# Patient Record
Sex: FEMALE | Race: WHITE | NOT HISPANIC OR LATINO | Employment: UNEMPLOYED | URBAN - METROPOLITAN AREA
[De-identification: names, ages, dates, MRNs, and addresses within clinical notes are randomized per-mention and may not be internally consistent; named-entity substitution may affect disease eponyms.]

---

## 2023-02-24 LAB — THYROTROPIN (MIU/L) IN SER/PLAS BY DETECTION LIMIT <= 0.05 MIU/L: 0.03 MIU/L (ref 0.44–3.98)

## 2023-02-28 LAB
THYROGLOBULIN AB (IU/ML) IN SER/PLAS: <0.9 IU/ML (ref 0–4)
THYROGLOBULIN LC-MS/MS: ABNORMAL NG/ML (ref 1.3–31.8)
THYROGLOBULIN: 0.1 NG/ML (ref 1.3–31.8)

## 2023-05-01 ENCOUNTER — TELEPHONE (OUTPATIENT)
Dept: PRIMARY CARE | Facility: CLINIC | Age: 48
End: 2023-05-01

## 2023-05-01 NOTE — TELEPHONE ENCOUNTER
Pt has loss weight and is still taking BP med twice a day. Her BP is low at 104/74 ,106/72. She's asking if her meds should be adjusted?  
DISPLAY PLAN FREE TEXT
DISPLAY PLAN FREE TEXT

## 2023-06-14 PROBLEM — N91.2 AMENORRHEA: Status: ACTIVE | Noted: 2023-06-14

## 2023-06-14 PROBLEM — R73.02 IGT (IMPAIRED GLUCOSE TOLERANCE): Status: ACTIVE | Noted: 2023-06-14

## 2023-06-14 PROBLEM — I10 HTN (HYPERTENSION), BENIGN: Status: ACTIVE | Noted: 2023-06-14

## 2023-06-14 PROBLEM — R91.1 LUNG NODULE: Status: ACTIVE | Noted: 2023-06-14

## 2023-06-14 PROBLEM — E78.6 HDL DEFICIENCY: Status: ACTIVE | Noted: 2023-06-14

## 2023-06-14 PROBLEM — C73 HURTHLE CELL CARCINOMA OF THYROID (MULTI): Status: ACTIVE | Noted: 2023-06-14

## 2023-06-14 PROBLEM — R59.1 LYMPHADENOPATHY: Status: ACTIVE | Noted: 2023-06-14

## 2023-06-14 RX ORDER — LEVOTHYROXINE SODIUM 112 UG/1
112 TABLET ORAL DAILY
COMMUNITY
Start: 2023-04-24 | End: 2024-01-18 | Stop reason: SDUPTHER

## 2023-06-14 RX ORDER — LEVOTHYROXINE SODIUM 125 UG/1
125 TABLET ORAL DAILY
COMMUNITY
End: 2023-06-15 | Stop reason: ALTCHOICE

## 2023-06-14 RX ORDER — AMLODIPINE BESYLATE 5 MG/1
5 TABLET ORAL DAILY
COMMUNITY
Start: 2018-01-15 | End: 2023-09-01

## 2023-06-15 ENCOUNTER — OFFICE VISIT (OUTPATIENT)
Dept: PRIMARY CARE | Facility: CLINIC | Age: 48
End: 2023-06-15
Payer: COMMERCIAL

## 2023-06-15 VITALS
OXYGEN SATURATION: 100 % | HEART RATE: 70 BPM | HEIGHT: 62 IN | WEIGHT: 144 LBS | TEMPERATURE: 97.1 F | SYSTOLIC BLOOD PRESSURE: 144 MMHG | DIASTOLIC BLOOD PRESSURE: 90 MMHG | BODY MASS INDEX: 26.5 KG/M2

## 2023-06-15 DIAGNOSIS — R73.02 IGT (IMPAIRED GLUCOSE TOLERANCE): Primary | ICD-10-CM

## 2023-06-15 DIAGNOSIS — E78.6 HDL DEFICIENCY: ICD-10-CM

## 2023-06-15 DIAGNOSIS — C73 HURTHLE CELL CARCINOMA OF THYROID (MULTI): ICD-10-CM

## 2023-06-15 DIAGNOSIS — R91.1 LUNG NODULE: ICD-10-CM

## 2023-06-15 DIAGNOSIS — I10 HTN (HYPERTENSION), BENIGN: ICD-10-CM

## 2023-06-15 PROCEDURE — 99213 OFFICE O/P EST LOW 20 MIN: CPT | Performed by: INTERNAL MEDICINE

## 2023-06-15 PROCEDURE — 3077F SYST BP >= 140 MM HG: CPT | Performed by: INTERNAL MEDICINE

## 2023-06-15 PROCEDURE — 3080F DIAST BP >= 90 MM HG: CPT | Performed by: INTERNAL MEDICINE

## 2023-06-15 NOTE — PROGRESS NOTES
"Subjective   Patient ID: Rula Leyva is a 47 y.o. female who presents for Follow-up (With labs).    HPI follow up thyroid ca, htn, hypothyroidism  Feels great. Losing weight w/   No problems    Review of Systems  Bps 110/70 at home. Many reviewed    Objective   /90   Pulse 70   Temp 36.2 °C (97.1 °F)   Ht 1.575 m (5' 2\")   Wt 65.3 kg (144 lb)   SpO2 100%   BMI 26.34 kg/m²     Physical Exam  Gen nad, affect wnl  Heentt eomfg, face symmetric, ncat  Neck w/o la, tm, bruit  Lungs clear   Cv rrr nl s1, s2  Ext w/o edema  Neuro grossly nonfocal  Skin good color    Assessment/Plan   Diagnoses and all orders for this visit:  IGT (impaired glucose tolerance)  -     Comprehensive metabolic panel; Future  -     Lipid Panel; Future  HTN (hypertension), benign  -     Comprehensive metabolic panel; Future  -     Lipid Panel; Future  Hurthle cell carcinoma of thyroid (CMS/HCC)  Lung nodule  HDL deficiency     Follow up endo, gi, gy  See me in 6 mos  "

## 2023-06-28 LAB — THYROTROPIN (MIU/L) IN SER/PLAS BY DETECTION LIMIT <= 0.05 MIU/L: 0.1 MIU/L (ref 0.44–3.98)

## 2023-09-01 DIAGNOSIS — I10 HTN (HYPERTENSION), BENIGN: Primary | ICD-10-CM

## 2023-09-01 RX ORDER — AMLODIPINE BESYLATE 5 MG/1
5 TABLET ORAL 2 TIMES DAILY
Qty: 180 TABLET | Refills: 0 | Status: SHIPPED | OUTPATIENT
Start: 2023-09-01 | End: 2023-12-06

## 2023-11-17 ENCOUNTER — OFFICE VISIT (OUTPATIENT)
Dept: OTOLARYNGOLOGY | Facility: CLINIC | Age: 48
End: 2023-11-17
Payer: COMMERCIAL

## 2023-11-17 VITALS — TEMPERATURE: 97 F | BODY MASS INDEX: 26.39 KG/M2 | HEIGHT: 62 IN | WEIGHT: 143.4 LBS

## 2023-11-17 DIAGNOSIS — C73 HURTHLE CELL CARCINOMA OF THYROID (MULTI): Primary | ICD-10-CM

## 2023-11-17 DIAGNOSIS — H93.13 TINNITUS OF BOTH EARS: ICD-10-CM

## 2023-11-17 PROCEDURE — 1036F TOBACCO NON-USER: CPT | Performed by: OTOLARYNGOLOGY

## 2023-11-17 PROCEDURE — 99214 OFFICE O/P EST MOD 30 MIN: CPT | Performed by: OTOLARYNGOLOGY

## 2023-11-17 ASSESSMENT — PATIENT HEALTH QUESTIONNAIRE - PHQ9
2. FEELING DOWN, DEPRESSED OR HOPELESS: NOT AT ALL
SUM OF ALL RESPONSES TO PHQ9 QUESTIONS 1 AND 2: 0
1. LITTLE INTEREST OR PLEASURE IN DOING THINGS: NOT AT ALL

## 2023-11-17 NOTE — PROGRESS NOTES
s/p total thyroidectomy 2015 with post op jones    Followed by dr pavon     Slowly lowering levothyroxine    Good energy  Lost weigth intentionally      She is having some tinnitus right-sided high-pitched left-sided intermittently pulsatile    She has changed her blood pressure medicine to half a pill since losing weight      Physical Exam:  CONSTITUTIONAL:  No acute distress  VOICE:  No hoarseness or other abnormality  RESPIRATION:  Breathing comfortably, no stridor  CV:  No clubbing/cyanosis/edema in hands  EYES:  EOM intact, sclera normal  NEURO:  Alert and oriented times 3, Cranial nerves II-XII grossly intact and symmetric bilaterally  HEAD AND FACE:  Symmetric facial features, no masses or lesions, sinuses non-tender to palpation  SALIVARY GLANDS:  Parotid and submandibular glands normal bilaterally  EARS:  Normal external ears, external auditory canals, and TMs to otoscopy, normal hearing to whispered voice.  NOSE:  External nose midline, anterior rhinoscopy is normal with limited visualization to the anterior aspect of the interior turbinates, no bleeding or drainage, no lesions  ORAL CAVITY/OROPHARYNX/LIPS:  Normal mucous membranes, normal floor of mouth/tongue/OP, no masses or lesions  PHARYNGEAL WALLS:  No masses or lesions  NECK/LYMPH:  No LAD, no thyroid masses, trachea midline  SKIN:  helaed incision  PSYCH:  Alert and oriented with appropriate mood and affect             S/p     Stable post op    We will follow with Dr. Miller and will see me in a year we will obtain an audiogram for her tinnitus      We did discuss a differential diagnosis

## 2023-12-06 DIAGNOSIS — I10 HTN (HYPERTENSION), BENIGN: ICD-10-CM

## 2023-12-06 RX ORDER — AMLODIPINE BESYLATE 5 MG/1
5 TABLET ORAL 2 TIMES DAILY
Qty: 180 TABLET | Refills: 0 | Status: SHIPPED | OUTPATIENT
Start: 2023-12-06 | End: 2024-03-11

## 2023-12-12 ENCOUNTER — LAB (OUTPATIENT)
Dept: LAB | Facility: LAB | Age: 48
End: 2023-12-12
Payer: COMMERCIAL

## 2023-12-12 DIAGNOSIS — R73.02 IGT (IMPAIRED GLUCOSE TOLERANCE): ICD-10-CM

## 2023-12-12 DIAGNOSIS — I10 HTN (HYPERTENSION), BENIGN: ICD-10-CM

## 2023-12-12 LAB
ALBUMIN SERPL BCP-MCNC: 4.4 G/DL (ref 3.4–5)
ALP SERPL-CCNC: 56 U/L (ref 33–110)
ALT SERPL W P-5'-P-CCNC: 15 U/L (ref 7–45)
ANION GAP SERPL CALC-SCNC: 11 MMOL/L (ref 10–20)
AST SERPL W P-5'-P-CCNC: 17 U/L (ref 9–39)
BILIRUB SERPL-MCNC: 0.7 MG/DL (ref 0–1.2)
BUN SERPL-MCNC: 18 MG/DL (ref 6–23)
CALCIUM SERPL-MCNC: 9 MG/DL (ref 8.6–10.3)
CHLORIDE SERPL-SCNC: 103 MMOL/L (ref 98–107)
CHOLEST SERPL-MCNC: 226 MG/DL (ref 0–199)
CHOLESTEROL/HDL RATIO: 2.5
CO2 SERPL-SCNC: 28 MMOL/L (ref 21–32)
CREAT SERPL-MCNC: 0.91 MG/DL (ref 0.5–1.05)
GFR SERPL CREATININE-BSD FRML MDRD: 77 ML/MIN/1.73M*2
GLUCOSE SERPL-MCNC: 78 MG/DL (ref 74–99)
HDLC SERPL-MCNC: 90 MG/DL
LDLC SERPL CALC-MCNC: 129 MG/DL
NON HDL CHOLESTEROL: 136 MG/DL (ref 0–149)
POTASSIUM SERPL-SCNC: 4.4 MMOL/L (ref 3.5–5.3)
PROT SERPL-MCNC: 7.3 G/DL (ref 6.4–8.2)
SODIUM SERPL-SCNC: 138 MMOL/L (ref 136–145)
TRIGL SERPL-MCNC: 34 MG/DL (ref 0–149)
VLDL: 7 MG/DL (ref 0–40)

## 2023-12-12 PROCEDURE — 80053 COMPREHEN METABOLIC PANEL: CPT

## 2023-12-12 PROCEDURE — 36415 COLL VENOUS BLD VENIPUNCTURE: CPT

## 2023-12-12 PROCEDURE — 80061 LIPID PANEL: CPT

## 2023-12-18 ENCOUNTER — APPOINTMENT (OUTPATIENT)
Dept: PRIMARY CARE | Facility: CLINIC | Age: 48
End: 2023-12-18
Payer: COMMERCIAL

## 2024-01-15 ENCOUNTER — LAB (OUTPATIENT)
Dept: LAB | Facility: LAB | Age: 49
End: 2024-01-15
Payer: COMMERCIAL

## 2024-01-15 DIAGNOSIS — C73 MALIGNANT NEOPLASM OF THYROID GLAND (MULTI): Primary | ICD-10-CM

## 2024-01-15 LAB — TSH SERPL-ACNC: 5.12 MIU/L (ref 0.44–3.98)

## 2024-01-15 PROCEDURE — 84432 ASSAY OF THYROGLOBULIN: CPT

## 2024-01-15 PROCEDURE — 36415 COLL VENOUS BLD VENIPUNCTURE: CPT

## 2024-01-15 PROCEDURE — 84443 ASSAY THYROID STIM HORMONE: CPT

## 2024-01-15 PROCEDURE — 86800 THYROGLOBULIN ANTIBODY: CPT

## 2024-01-17 LAB
BILL ONLY-THYROGLOBULIN: NORMAL
THYROGLOB AB SERPL-ACNC: <0.9 IU/ML (ref 0–4)
THYROGLOB SERPL-MCNC: 0.1 NG/ML (ref 1.3–31.8)
THYROGLOB SERPL-MCNC: ABNORMAL NG/ML (ref 1.3–31.8)

## 2024-01-18 ENCOUNTER — OFFICE VISIT (OUTPATIENT)
Dept: ENDOCRINOLOGY | Facility: CLINIC | Age: 49
End: 2024-01-18
Payer: COMMERCIAL

## 2024-01-18 VITALS
DIASTOLIC BLOOD PRESSURE: 97 MMHG | HEIGHT: 62 IN | HEART RATE: 77 BPM | WEIGHT: 140.21 LBS | RESPIRATION RATE: 20 BRPM | BODY MASS INDEX: 25.8 KG/M2 | SYSTOLIC BLOOD PRESSURE: 167 MMHG

## 2024-01-18 DIAGNOSIS — C73 HURTHLE CELL CARCINOMA OF THYROID (MULTI): Primary | ICD-10-CM

## 2024-01-18 PROCEDURE — 1036F TOBACCO NON-USER: CPT | Performed by: INTERNAL MEDICINE

## 2024-01-18 PROCEDURE — 3080F DIAST BP >= 90 MM HG: CPT | Performed by: INTERNAL MEDICINE

## 2024-01-18 PROCEDURE — 3077F SYST BP >= 140 MM HG: CPT | Performed by: INTERNAL MEDICINE

## 2024-01-18 PROCEDURE — 99213 OFFICE O/P EST LOW 20 MIN: CPT | Performed by: INTERNAL MEDICINE

## 2024-01-18 RX ORDER — LEVOTHYROXINE SODIUM 112 UG/1
112 TABLET ORAL EVERY OTHER DAY
Qty: 45 TABLET | Refills: 3 | Status: SHIPPED | OUTPATIENT
Start: 2024-01-18 | End: 2025-01-17

## 2024-01-18 RX ORDER — LEVOTHYROXINE SODIUM 100 UG/1
100 TABLET ORAL EVERY OTHER DAY
Qty: 45 TABLET | Refills: 3 | Status: SHIPPED | OUTPATIENT
Start: 2024-01-18 | End: 2025-01-17

## 2024-01-18 ASSESSMENT — PATIENT HEALTH QUESTIONNAIRE - PHQ9
SUM OF ALL RESPONSES TO PHQ9 QUESTIONS 1 AND 2: 0
1. LITTLE INTEREST OR PLEASURE IN DOING THINGS: NOT AT ALL
2. FEELING DOWN, DEPRESSED OR HOPELESS: NOT AT ALL

## 2024-01-18 ASSESSMENT — COLUMBIA-SUICIDE SEVERITY RATING SCALE - C-SSRS
2. HAVE YOU ACTUALLY HAD ANY THOUGHTS OF KILLING YOURSELF?: NO
6. HAVE YOU EVER DONE ANYTHING, STARTED TO DO ANYTHING, OR PREPARED TO DO ANYTHING TO END YOUR LIFE?: NO
1. IN THE PAST MONTH, HAVE YOU WISHED YOU WERE DEAD OR WISHED YOU COULD GO TO SLEEP AND NOT WAKE UP?: NO

## 2024-01-18 ASSESSMENT — ENCOUNTER SYMPTOMS: UNEXPECTED WEIGHT CHANGE: 0

## 2024-01-18 ASSESSMENT — PAIN SCALES - GENERAL: PAINLEVEL: 0-NO PAIN

## 2024-01-18 NOTE — PROGRESS NOTES
History Of Present Illness  Rula Leyva is a 48 y.o. female with a history of thyroid cancer.     Levothyroxine decreased from 125 to 100 mcg/day  Patient is taking levothyroxine on an empty stomach with water alone.    Thyroid cancer diagnosis date: 10/8/15  Type: Hurthle cell  Size of primary tumor: 1.7 cm, capsular and vascular invasion, no ETE  Lymph Nodes: 0/1  Distant Metastases: None known  Pathologic Staging: pT1 N0 Mx. Clinical Stage: I     Surgery: Right lobectomy (10/8/15), Completion thyroidectomy (10/22/15)      Radioiodine Therapy (11/6/15): 156 mCi, hypothyroid  Post-Therapy 131-I WBS: Focal uptake in lower neck.        Past Medical History  She has a past medical history of Acute sinusitis, unspecified (2018), Anxiety disorder, unspecified, Essential (primary) hypertension (2015), Pain in throat (2018), Personal history of malignant neoplasm, unspecified, Personal history of other diseases of the female genital tract (2017), Personal history of other endocrine, nutritional and metabolic disease (09/15/2015), Personal history of other endocrine, nutritional and metabolic disease (2015), Personal history of other specified conditions (2015), Personal history of other specified conditions (2018), Sialoadenitis, unspecified, Unspecified abnormal findings in urine (2015), and Unspecified voice and resonance disorder (10/20/2015).    Surgical History  She has a past surgical history that includes  section, classic (2014); Ankle surgery (2014); Dilation and curettage of uterus (2014); Tubal ligation (2014); Total thyroidectomy (2016); US guided thyroid biopsy (2015); and CT guided percutaneous biopsy lung (2015).     Social History  She reports that she has never smoked. She has never used smokeless tobacco. She reports current alcohol use. She reports that she does not use drugs.    Family History  Family  "History   Problem Relation Name Age of Onset    Breast cancer Other          Unknown Family Member    Pancreatic cancer Other          Unknown Family Member       Medications  Current Outpatient Medications   Medication Instructions    amLODIPine (NORVASC) 5 mg, oral, 2 times daily    levothyroxine (SYNTHROID, LEVOXYL) 112 mcg, oral, Daily       Allergies  Amoxicillin-pot clavulanate and Azithromycin    Review of Systems   Constitutional:  Negative for unexpected weight change.         Last Recorded Vitals  Blood pressure (!) 167/97, pulse 77, resp. rate 20, height 1.575 m (5' 2\"), weight 63.6 kg (140 lb 3.4 oz).    Physical Exam  Constitutional:       General: She is not in acute distress.  Neurological:      Mental Status: She is alert.          Relevant Results  Lab Results   Component Value Date    TSH 5.12 (H) 01/15/2024    THYROGLOBU 0.1 (L) 01/15/2024    THYROGLCMSMS Not Applicable 01/15/2024    THYROGLOBULI <0.9 01/15/2024           IMPRESSION  HURTHLE CELL CARCINOMA OF THYROID  Single lesion right lobe with capsular and vascular invasion, no extrathyroidal extension, no known lymph node involvement or metastasis.  No Thyroglobulin evidence of persistent disease at 6 year.   TSH elevated, previously suppressed on 112 or 125 mcg/day      RECOMMENDATIONS  Levothyroxine alternate 100 vs 112 mcg/day  Take levothyroxine on an empty stomach with water alone, 1 hour before eating or taking other medications, 4 hours before any calcium or iron supplement.    Follow up 4-6 months  Repeat TSH before next appointment      "

## 2024-01-18 NOTE — PATIENT INSTRUCTIONS
RECOMMENDATIONS  Levothyroxine alternate 100 vs 112 mcg/day  Take levothyroxine on an empty stomach with water alone, 1 hour before eating or taking other medications, 4 hours before any calcium or iron supplement.    Follow up 4-6 months  Repeat TSH before next appointment

## 2024-01-18 NOTE — LETTER
January 18, 2024     Arnel Rodriguez MD  [address]  [facility], [address]  [place] [address]    Patient: Rula Leyva   YOB: 1975   Date of Visit: 1/18/2024       Dear Dr. Arnel Rodriguez MD:    Thank you for referring Rula Leyva to me for evaluation. Below are my notes for this consultation.  If you have questions, please do not hesitate to call me. I look forward to following your patient along with you.       Sincerely,     Chauncey Lawson MD      CC: Devyn Romano MD  ______________________________________________________________________________________    History Of Present Illness  Rula Leyva is a 48 y.o. female with a history of thyroid cancer.     Levothyroxine decreased from 125 to 100 mcg/day  Patient is taking levothyroxine on an empty stomach with water alone.    Thyroid cancer diagnosis date: 10/8/15  Type: Hurthle cell  Size of primary tumor: 1.7 cm, capsular and vascular invasion, no ETE  Lymph Nodes: 0/1  Distant Metastases: None known  Pathologic Staging: pT1 N0 Mx. Clinical Stage: I     Surgery: Right lobectomy (10/8/15), Completion thyroidectomy (10/22/15)      Radioiodine Therapy (11/6/15): 156 mCi, hypothyroid  Post-Therapy 131-I WBS: Focal uptake in lower neck.        Past Medical History  She has a past medical history of Acute sinusitis, unspecified (11/13/2018), Anxiety disorder, unspecified, Essential (primary) hypertension (09/02/2015), Pain in throat (02/09/2018), Personal history of malignant neoplasm, unspecified, Personal history of other diseases of the female genital tract (02/27/2017), Personal history of other endocrine, nutritional and metabolic disease (09/15/2015), Personal history of other endocrine, nutritional and metabolic disease (08/18/2015), Personal history of other specified conditions (07/29/2015), Personal history of other specified conditions (12/17/2018), Sialoadenitis, unspecified, Unspecified abnormal findings in  "urine (2015), and Unspecified voice and resonance disorder (10/20/2015).    Surgical History  She has a past surgical history that includes  section, classic (2014); Ankle surgery (2014); Dilation and curettage of uterus (2014); Tubal ligation (2014); Total thyroidectomy (2016); US guided thyroid biopsy (2015); and CT guided percutaneous biopsy lung (2015).     Social History  She reports that she has never smoked. She has never used smokeless tobacco. She reports current alcohol use. She reports that she does not use drugs.    Family History  Family History   Problem Relation Name Age of Onset   • Breast cancer Other          Unknown Family Member   • Pancreatic cancer Other          Unknown Family Member       Medications  Current Outpatient Medications   Medication Instructions   • amLODIPine (NORVASC) 5 mg, oral, 2 times daily   • levothyroxine (SYNTHROID, LEVOXYL) 112 mcg, oral, Daily       Allergies  Amoxicillin-pot clavulanate and Azithromycin    Review of Systems   Constitutional:  Negative for unexpected weight change.         Last Recorded Vitals  Blood pressure (!) 167/97, pulse 77, resp. rate 20, height 1.575 m (5' 2\"), weight 63.6 kg (140 lb 3.4 oz).    Physical Exam  Constitutional:       General: She is not in acute distress.  Neurological:      Mental Status: She is alert.          Relevant Results  Lab Results   Component Value Date    TSH 5.12 (H) 01/15/2024    THYROGLOBU 0.1 (L) 01/15/2024    THYROGLCMSMS Not Applicable 01/15/2024    THYROGLOBULI <0.9 01/15/2024           IMPRESSION  HURTHLE CELL CARCINOMA OF THYROID  Single lesion right lobe with capsular and vascular invasion, no extrathyroidal extension, no known lymph node involvement or metastasis.  No Thyroglobulin evidence of persistent disease at 6 year.   TSH elevated, previously suppressed on 112 or 125 mcg/day      RECOMMENDATIONS  Levothyroxine alternate 100 vs 112 mcg/day  Take " levothyroxine on an empty stomach with water alone, 1 hour before eating or taking other medications, 4 hours before any calcium or iron supplement.    Follow up 4-6 months  Repeat TSH before next appointment

## 2024-02-08 ENCOUNTER — APPOINTMENT (OUTPATIENT)
Dept: PRIMARY CARE | Facility: CLINIC | Age: 49
End: 2024-02-08
Payer: COMMERCIAL

## 2024-03-11 DIAGNOSIS — I10 HTN (HYPERTENSION), BENIGN: ICD-10-CM

## 2024-03-11 RX ORDER — AMLODIPINE BESYLATE 5 MG/1
5 TABLET ORAL 2 TIMES DAILY
Qty: 180 TABLET | Refills: 0 | Status: SHIPPED | OUTPATIENT
Start: 2024-03-11

## 2024-03-19 ENCOUNTER — APPOINTMENT (OUTPATIENT)
Dept: PRIMARY CARE | Facility: CLINIC | Age: 49
End: 2024-03-19
Payer: COMMERCIAL

## 2024-04-24 ENCOUNTER — OFFICE VISIT (OUTPATIENT)
Dept: PRIMARY CARE | Facility: CLINIC | Age: 49
End: 2024-04-24
Payer: COMMERCIAL

## 2024-04-24 VITALS
WEIGHT: 139 LBS | BODY MASS INDEX: 25.58 KG/M2 | TEMPERATURE: 97.3 F | HEIGHT: 62 IN | OXYGEN SATURATION: 99 % | HEART RATE: 68 BPM | SYSTOLIC BLOOD PRESSURE: 140 MMHG | DIASTOLIC BLOOD PRESSURE: 90 MMHG

## 2024-04-24 DIAGNOSIS — C73 HURTHLE CELL CARCINOMA OF THYROID (MULTI): ICD-10-CM

## 2024-04-24 DIAGNOSIS — I10 HTN (HYPERTENSION), BENIGN: Primary | ICD-10-CM

## 2024-04-24 DIAGNOSIS — R73.02 IGT (IMPAIRED GLUCOSE TOLERANCE): ICD-10-CM

## 2024-04-24 PROCEDURE — 99213 OFFICE O/P EST LOW 20 MIN: CPT | Performed by: INTERNAL MEDICINE

## 2024-04-24 PROCEDURE — 3077F SYST BP >= 140 MM HG: CPT | Performed by: INTERNAL MEDICINE

## 2024-04-24 PROCEDURE — 3080F DIAST BP >= 90 MM HG: CPT | Performed by: INTERNAL MEDICINE

## 2024-04-24 NOTE — PROGRESS NOTES
"Subjective   Patient ID: Rula Leyva is a 48 y.o. female who presents for Follow-up.    HPI  feels well  Fu thyroid ca, htn, igt, low hdl    Review of Systems  Feels good  Bps 110/70s at home    Objective   /90   Pulse 68   Temp 36.3 °C (97.3 °F)   Ht 1.575 m (5' 2\")   Wt 63 kg (139 lb)   SpO2 99%   BMI 25.42 kg/m²     Physical Exam  Gen nad, affect wnl  Heentt eomfg, face symmetric, ncat  Neck w/o la, tm, bruit  Lungs clear   Cv rrr nl s1, s2  Ext w/o edema  Neuro grossly nonfocal  Skin good color    Assessment/Plan   Diagnoses and all orders for this visit:  HTN (hypertension), benign  IGT (impaired glucose tolerance)  Hurthle cell carcinoma of thyroid (Multi)    Fu gyn, gi, endo  Fu 6 mos     "

## 2024-06-03 ENCOUNTER — LAB (OUTPATIENT)
Dept: LAB | Facility: LAB | Age: 49
End: 2024-06-03
Payer: COMMERCIAL

## 2024-06-03 DIAGNOSIS — C73 HURTHLE CELL CARCINOMA OF THYROID (MULTI): ICD-10-CM

## 2024-06-03 LAB — TSH SERPL-ACNC: 1.17 MIU/L (ref 0.44–3.98)

## 2024-06-03 PROCEDURE — 36415 COLL VENOUS BLD VENIPUNCTURE: CPT

## 2024-06-03 PROCEDURE — 84443 ASSAY THYROID STIM HORMONE: CPT

## 2024-06-20 ENCOUNTER — APPOINTMENT (OUTPATIENT)
Dept: ENDOCRINOLOGY | Facility: CLINIC | Age: 49
End: 2024-06-20
Payer: COMMERCIAL

## 2024-06-20 VITALS
WEIGHT: 138.89 LBS | SYSTOLIC BLOOD PRESSURE: 136 MMHG | BODY MASS INDEX: 25.4 KG/M2 | DIASTOLIC BLOOD PRESSURE: 89 MMHG | HEART RATE: 66 BPM

## 2024-06-20 DIAGNOSIS — C73 HURTHLE CELL CARCINOMA OF THYROID (MULTI): ICD-10-CM

## 2024-06-20 PROCEDURE — 3079F DIAST BP 80-89 MM HG: CPT | Performed by: INTERNAL MEDICINE

## 2024-06-20 PROCEDURE — 99213 OFFICE O/P EST LOW 20 MIN: CPT | Performed by: INTERNAL MEDICINE

## 2024-06-20 PROCEDURE — 3075F SYST BP GE 130 - 139MM HG: CPT | Performed by: INTERNAL MEDICINE

## 2024-06-20 RX ORDER — LEVOTHYROXINE SODIUM 112 UG/1
112 TABLET ORAL EVERY OTHER DAY
Qty: 45 TABLET | Refills: 3 | Status: SHIPPED | OUTPATIENT
Start: 2024-06-20 | End: 2025-06-20

## 2024-06-20 RX ORDER — LEVOTHYROXINE SODIUM 100 UG/1
100 TABLET ORAL EVERY OTHER DAY
Qty: 45 TABLET | Refills: 3 | Status: SHIPPED | OUTPATIENT
Start: 2024-06-20 | End: 2025-06-20

## 2024-06-20 NOTE — PROGRESS NOTES
History Of Present Illness  Rula Leyva is a 48 y.o. female with a history of thyroid cancer.     Levothyroxine 100 vs 112 mcg/day  Patient is taking levothyroxine on an empty stomach with water alone.    Thyroid cancer diagnosis date: 10/8/15  Type: Hurthle cell  Size of primary tumor: 1.7 cm, capsular and vascular invasion, no ETE  Lymph Nodes: 0/1  Distant Metastases: None known  Pathologic Staging: pT1 N0 Mx. Clinical Stage: I     Surgery: Right lobectomy (10/8/15), Completion thyroidectomy (10/22/15)      Radioiodine Therapy (11/6/15): 156 mCi, hypothyroid  Post-Therapy 131-I WBS: Focal uptake in lower neck.     Past Medical History  She has a past medical history of Acute sinusitis, unspecified (2018), Anxiety disorder, unspecified, Essential (primary) hypertension (2015), Pain in throat (2018), Personal history of malignant neoplasm, unspecified, Personal history of other diseases of the female genital tract (2017), Personal history of other endocrine, nutritional and metabolic disease (09/15/2015), Personal history of other endocrine, nutritional and metabolic disease (2015), Personal history of other specified conditions (2015), Personal history of other specified conditions (2018), Sialoadenitis, unspecified, Unspecified abnormal findings in urine (2015), and Unspecified voice and resonance disorder (10/20/2015).    Surgical History  She has a past surgical history that includes  section, classic (2014); Ankle surgery (2014); Dilation and curettage of uterus (2014); Tubal ligation (2014); Total thyroidectomy (2016); US guided thyroid biopsy (2015); and CT guided percutaneous biopsy lung (2015).     Social History  She reports that she has never smoked. She has never used smokeless tobacco. She reports current alcohol use. She reports that she does not use drugs.    Family History  Family History   Problem  Relation Name Age of Onset    Breast cancer Other          Unknown Family Member    Pancreatic cancer Other          Unknown Family Member       Medications  Current Outpatient Medications   Medication Instructions    amLODIPine (NORVASC) 5 mg, oral, 2 times daily    levothyroxine (SYNTHROID, LEVOXYL) 112 mcg, oral, Every other day    levothyroxine (SYNTHROID, LEVOXYL) 100 mcg, oral, Every other day       Allergies  Amoxicillin-pot clavulanate and Azithromycin      Last Recorded Vitals  Blood pressure 136/89, pulse 66, weight 63 kg (138 lb 14.2 oz).    Physical Exam  Constitutional:       General: She is not in acute distress.  HENT:      Head: Normocephalic.   Neurological:      Mental Status: She is alert.   Psychiatric:         Mood and Affect: Affect normal.          Relevant Results  Lab Results   Component Value Date    TSH 1.17 06/03/2024    THYROGLOBU 0.1 (L) 01/15/2024    THYROGLCMSMS Not Applicable 01/15/2024    THYROGLOBULI <0.9 01/15/2024           IMPRESSION  HURTHLE CELL CARCINOMA OF THYROID  Single lesion right lobe with capsular and vascular invasion, no extrathyroidal extension, no known lymph node involvement or metastasis.  No Thyroglobulin evidence of persistent disease at 6 year.   TSH optimal, previously suppressed on 112 or 125 mcg/day      RECOMMENDATIONS  Continue levothyroxine 100 alternating with 112 mcg every other day.   Take levothyroxine on an empty stomach with water alone, 1 hour before eating or taking other medications, 4 hours before any calcium or iron supplement.    Follow up January  Repeat TSH, thyroglobulin 1 week before next appointment

## 2024-06-20 NOTE — PATIENT INSTRUCTIONS
RECOMMENDATIONS  Continue levothyroxine 100 alternating with 112 mcg every other day.   Take levothyroxine on an empty stomach with water alone, 1 hour before eating or taking other medications, 4 hours before any calcium or iron supplement.    Follow up January  Repeat TSH, thyroglobulin 1 week before next appointment

## 2024-06-20 NOTE — LETTER
June 21, 2024     Arnel Rodriguez MD  [address]  [facility], [address]  [place] [address]    Patient: Rula Leyva   YOB: 1975   Date of Visit: 6/20/2024       Dear Dr. Arnel Rodriguez MD:    Thank you for referring Rula Leyva to me for evaluation. Below are my notes for this consultation.  If you have questions, please do not hesitate to call me. I look forward to following your patient along with you.       Sincerely,     Chauncey Lawson MD      CC: No Recipients  ______________________________________________________________________________________    History Of Present Illness  Rula Leyva is a 48 y.o. female with a history of thyroid cancer.     Levothyroxine 100 vs 112 mcg/day  Patient is taking levothyroxine on an empty stomach with water alone.    Thyroid cancer diagnosis date: 10/8/15  Type: Hurthle cell  Size of primary tumor: 1.7 cm, capsular and vascular invasion, no ETE  Lymph Nodes: 0/1  Distant Metastases: None known  Pathologic Staging: pT1 N0 Mx. Clinical Stage: I     Surgery: Right lobectomy (10/8/15), Completion thyroidectomy (10/22/15)      Radioiodine Therapy (11/6/15): 156 mCi, hypothyroid  Post-Therapy 131-I WBS: Focal uptake in lower neck.     Past Medical History  She has a past medical history of Acute sinusitis, unspecified (11/13/2018), Anxiety disorder, unspecified, Essential (primary) hypertension (09/02/2015), Pain in throat (02/09/2018), Personal history of malignant neoplasm, unspecified, Personal history of other diseases of the female genital tract (02/27/2017), Personal history of other endocrine, nutritional and metabolic disease (09/15/2015), Personal history of other endocrine, nutritional and metabolic disease (08/18/2015), Personal history of other specified conditions (07/29/2015), Personal history of other specified conditions (12/17/2018), Sialoadenitis, unspecified, Unspecified abnormal findings in urine (06/16/2015), and  Unspecified voice and resonance disorder (10/20/2015).    Surgical History  She has a past surgical history that includes  section, classic (2014); Ankle surgery (2014); Dilation and curettage of uterus (2014); Tubal ligation (2014); Total thyroidectomy (2016); US guided thyroid biopsy (2015); and CT guided percutaneous biopsy lung (2015).     Social History  She reports that she has never smoked. She has never used smokeless tobacco. She reports current alcohol use. She reports that she does not use drugs.    Family History  Family History   Problem Relation Name Age of Onset   • Breast cancer Other          Unknown Family Member   • Pancreatic cancer Other          Unknown Family Member       Medications  Current Outpatient Medications   Medication Instructions   • amLODIPine (NORVASC) 5 mg, oral, 2 times daily   • levothyroxine (SYNTHROID, LEVOXYL) 112 mcg, oral, Every other day   • levothyroxine (SYNTHROID, LEVOXYL) 100 mcg, oral, Every other day       Allergies  Amoxicillin-pot clavulanate and Azithromycin      Last Recorded Vitals  Blood pressure 136/89, pulse 66, weight 63 kg (138 lb 14.2 oz).    Physical Exam  Constitutional:       General: She is not in acute distress.  HENT:      Head: Normocephalic.   Neurological:      Mental Status: She is alert.   Psychiatric:         Mood and Affect: Affect normal.          Relevant Results  Lab Results   Component Value Date    TSH 1.17 2024    THYROGLOBU 0.1 (L) 01/15/2024    THYROGLCMSMS Not Applicable 01/15/2024    THYROGLOBULI <0.9 01/15/2024           IMPRESSION  HURTHLE CELL CARCINOMA OF THYROID  Single lesion right lobe with capsular and vascular invasion, no extrathyroidal extension, no known lymph node involvement or metastasis.  No Thyroglobulin evidence of persistent disease at 6 year.   TSH optimal, previously suppressed on 112 or 125 mcg/day      RECOMMENDATIONS  Continue levothyroxine 100 alternating  with 112 mcg every other day.   Take levothyroxine on an empty stomach with water alone, 1 hour before eating or taking other medications, 4 hours before any calcium or iron supplement.    Follow up January  Repeat TSH, thyroglobulin 1 week before next appointment

## 2024-09-19 DIAGNOSIS — I10 HTN (HYPERTENSION), BENIGN: ICD-10-CM

## 2024-09-19 RX ORDER — AMLODIPINE BESYLATE 5 MG/1
5 TABLET ORAL 2 TIMES DAILY
Qty: 180 TABLET | Refills: 0 | Status: SHIPPED | OUTPATIENT
Start: 2024-09-19

## 2024-10-24 ENCOUNTER — APPOINTMENT (OUTPATIENT)
Dept: PRIMARY CARE | Facility: CLINIC | Age: 49
End: 2024-10-24
Payer: COMMERCIAL

## 2024-10-24 VITALS
BODY MASS INDEX: 25.76 KG/M2 | HEIGHT: 62 IN | HEART RATE: 85 BPM | WEIGHT: 140 LBS | OXYGEN SATURATION: 99 % | DIASTOLIC BLOOD PRESSURE: 82 MMHG | SYSTOLIC BLOOD PRESSURE: 138 MMHG

## 2024-10-24 DIAGNOSIS — R73.02 IGT (IMPAIRED GLUCOSE TOLERANCE): Primary | ICD-10-CM

## 2024-10-24 DIAGNOSIS — Z00.00 WELLNESS EXAMINATION: ICD-10-CM

## 2024-10-24 DIAGNOSIS — I10 HTN (HYPERTENSION), BENIGN: ICD-10-CM

## 2024-10-24 DIAGNOSIS — C73 HURTHLE CELL CARCINOMA OF THYROID (MULTI): ICD-10-CM

## 2024-10-24 PROCEDURE — 3075F SYST BP GE 130 - 139MM HG: CPT | Performed by: INTERNAL MEDICINE

## 2024-10-24 PROCEDURE — 99213 OFFICE O/P EST LOW 20 MIN: CPT | Performed by: INTERNAL MEDICINE

## 2024-10-24 PROCEDURE — 3008F BODY MASS INDEX DOCD: CPT | Performed by: INTERNAL MEDICINE

## 2024-10-24 PROCEDURE — 3079F DIAST BP 80-89 MM HG: CPT | Performed by: INTERNAL MEDICINE

## 2024-11-11 DIAGNOSIS — I10 HTN (HYPERTENSION), BENIGN: ICD-10-CM

## 2024-11-12 RX ORDER — AMLODIPINE BESYLATE 5 MG/1
5 TABLET ORAL 2 TIMES DAILY
Qty: 180 TABLET | Refills: 3 | Status: SHIPPED | OUTPATIENT
Start: 2024-11-12

## 2024-11-22 ENCOUNTER — APPOINTMENT (OUTPATIENT)
Dept: OTOLARYNGOLOGY | Facility: CLINIC | Age: 49
End: 2024-11-22
Payer: COMMERCIAL

## 2024-12-06 ENCOUNTER — APPOINTMENT (OUTPATIENT)
Dept: OTOLARYNGOLOGY | Facility: CLINIC | Age: 49
End: 2024-12-06
Payer: COMMERCIAL

## 2024-12-22 ENCOUNTER — OFFICE VISIT (OUTPATIENT)
Dept: URGENT CARE | Age: 49
End: 2024-12-22
Payer: COMMERCIAL

## 2024-12-22 VITALS
OXYGEN SATURATION: 98 % | TEMPERATURE: 97.6 F | DIASTOLIC BLOOD PRESSURE: 87 MMHG | RESPIRATION RATE: 24 BRPM | HEART RATE: 77 BPM | SYSTOLIC BLOOD PRESSURE: 143 MMHG

## 2024-12-22 DIAGNOSIS — R68.89 FLU-LIKE SYMPTOMS: ICD-10-CM

## 2024-12-22 DIAGNOSIS — J32.0 MAXILLARY SINUSITIS, UNSPECIFIED CHRONICITY: Primary | ICD-10-CM

## 2024-12-22 LAB
POC RAPID INFLUENZA A: NEGATIVE
POC RAPID INFLUENZA B: NEGATIVE

## 2024-12-22 PROCEDURE — 3077F SYST BP >= 140 MM HG: CPT

## 2024-12-22 PROCEDURE — 1036F TOBACCO NON-USER: CPT

## 2024-12-22 PROCEDURE — 3079F DIAST BP 80-89 MM HG: CPT

## 2024-12-22 PROCEDURE — 99203 OFFICE O/P NEW LOW 30 MIN: CPT

## 2024-12-22 PROCEDURE — 87804 INFLUENZA ASSAY W/OPTIC: CPT

## 2024-12-22 RX ORDER — DOXYCYCLINE 100 MG/1
100 CAPSULE ORAL 2 TIMES DAILY
Qty: 20 CAPSULE | Refills: 0 | Status: SHIPPED | OUTPATIENT
Start: 2024-12-22 | End: 2025-01-01

## 2024-12-22 ASSESSMENT — ENCOUNTER SYMPTOMS
SORE THROAT: 1
COUGH: 1
FEVER: 1

## 2024-12-22 NOTE — PROGRESS NOTES
Subjective   Patient ID: Rula Leyva is a 49 y.o. female. They present today with a chief complaint of Sore Throat, Cough, Nasal Congestion, and Fever (Symptoms for a week).    History of Present Illness    Sore Throat   Associated symptoms include coughing.   Cough  Associated symptoms include a fever and a sore throat.   Fever   Associated symptoms include coughing and a sore throat.   A 49-year-old female arrives to the clinic with chief complaint of sore throat, cough, nasal congestion and fevers.  The patient reports having the symptoms over the last week.  She is here for further evaluation health maintenance.    Past Medical History  Allergies as of 12/22/2024 - Reviewed 12/22/2024   Allergen Reaction Noted    Amoxicillin-pot clavulanate Other 06/14/2023    Azithromycin Other 06/14/2023       (Not in a hospital admission)       Past Medical History:   Diagnosis Date    Acute sinusitis, unspecified 11/13/2018    Acute non-recurrent sinusitis, unspecified location    Anxiety disorder, unspecified     Anxiety and depression    Essential (primary) hypertension 09/02/2015    Benign essential hypertension    Pain in throat 02/09/2018    Throat discomfort    Personal history of malignant neoplasm, unspecified     History of malignant neoplasm    Personal history of other diseases of the female genital tract 02/27/2017    History of amenorrhea    Personal history of other endocrine, nutritional and metabolic disease 09/15/2015    History of thyroid nodule    Personal history of other endocrine, nutritional and metabolic disease 08/18/2015    History of goiter    Personal history of other specified conditions 07/29/2015    History of abdominal pain    Personal history of other specified conditions 12/17/2018    History of vertigo    Sialoadenitis, unspecified     Sialoadenitis    Unspecified abnormal findings in urine 06/16/2015    Abnormal urine findings    Unspecified voice and resonance disorder 10/20/2015     Change in voice       Past Surgical History:   Procedure Laterality Date    ANKLE SURGERY  2014    Ankle Surgery     SECTION, CLASSIC  2014     Section    CT GUIDED PERCUTANEOUS BIOPSY LUNG  2015    CT GUIDED PERCUTANEOUS BIOPSY LUNG 2015 [facility] ANCILLARY LEGACY    DILATION AND CURETTAGE OF UTERUS  2014    Dilation And Curettage    TOTAL THYROIDECTOMY  2016    Thyroid Surgery Total Thyroidectomy    TUBAL LIGATION  2014    Tubal Ligation    US GUIDED THYROID BIOPSY  2015    US GUIDED THYROID BIOPSY 2015 [facility] ANCILLARY LEGACY        reports that she has never smoked. She has never used smokeless tobacco. She reports current alcohol use. She reports that she does not use drugs.    Review of Systems  Review of Systems   Constitutional:  Positive for fever.   HENT:  Positive for sore throat.    Respiratory:  Positive for cough.                                   Objective    Vitals:    24 1011   BP: 143/87   Pulse: 77   Resp: 24   Temp: 36.4 °C (97.6 °F)   SpO2: 98%     No LMP recorded. Patient is postmenopausal.    Physical Exam  Vitals and nursing note reviewed.   Constitutional:       Appearance: Normal appearance.   HENT:      Head: Normocephalic and atraumatic.      Right Ear: Tympanic membrane normal.      Left Ear: Tympanic membrane normal.      Nose: Nose normal.      Mouth/Throat:      Mouth: Mucous membranes are moist.      Pharynx: Oropharynx is clear.   Eyes:      Extraocular Movements: Extraocular movements intact.      Conjunctiva/sclera: Conjunctivae normal.      Pupils: Pupils are equal, round, and reactive to light.   Cardiovascular:      Rate and Rhythm: Normal rate and regular rhythm.   Pulmonary:      Effort: Pulmonary effort is normal.      Breath sounds: Normal breath sounds.   Abdominal:      General: Bowel sounds are normal.      Palpations: Abdomen is soft.   Musculoskeletal:         General: Normal range of motion.      Cervical  back: Normal range of motion and neck supple.   Skin:     General: Skin is warm.      Capillary Refill: Capillary refill takes less than 2 seconds.   Neurological:      General: No focal deficit present.      Mental Status: She is alert and oriented to person, place, and time. Mental status is at baseline.   Psychiatric:         Mood and Affect: Mood normal.         Behavior: Behavior normal.         Thought Content: Thought content normal.         Judgment: Judgment normal.         Procedures    Point of Care Test & Imaging Results from this visit  Results for orders placed or performed in visit on 12/22/24   POCT Influenza A/B manually resulted   Result Value Ref Range    POC Rapid Influenza A Negative Negative    POC Rapid Influenza B Negative Negative      No results found.    Diagnostic study results (if any) were reviewed by DISHA Staples.    Assessment/Plan   Allergies, medications, history, and pertinent labs/EKGs/Imaging reviewed by DISHA Staples.     Medical Decision Making  Upon initial assessment, the patient was sitting calmly bedside chair in no acute distress.  Rapid influenza was negative.  Given the longevity of her symptoms and comorbidities, it is reasonable to prescribe an antibiotic at this time.  I did send her home with doxycycline 100 mg oral tablet twice a day for 10 days as she has an allergy to Z-Devonte and Augmentin.  Over-the-counter medications as discussed.  Follow-up with your primary care provider.    As a result of the work-up, the patient was discharged home.  she was informed of her diagnosis and instructed to come back with any concerns or worsening of condition.  she and was agreeable to the plan as discussed above.  she was given the opportunity to ask questions.  All of the patient's questions were answered.    This document was generated using the assistance of voice recognition software. If there are any errors of spelling, grammar, syntax, or meaning;  please feel free to contact me directly for clarification.      Orders and Diagnoses  Diagnoses and all orders for this visit:  Maxillary sinusitis, unspecified chronicity  -     doxycycline (Vibramycin) 100 mg capsule; Take 1 capsule (100 mg) by mouth 2 times a day for 10 days. Take with at least 8 ounces (large glass) of water, do not lie down for 30 minutes after  Flu-like symptoms  -     POCT Influenza A/B manually resulted      Medical Admin Record      Patient disposition: Home    Electronically signed by DISHA Staples  10:22 AM

## 2025-01-13 ENCOUNTER — LAB (OUTPATIENT)
Dept: LAB | Facility: LAB | Age: 50
End: 2025-01-13
Payer: COMMERCIAL

## 2025-01-13 DIAGNOSIS — C73 HURTHLE CELL CARCINOMA OF THYROID (MULTI): ICD-10-CM

## 2025-01-13 LAB — TSH SERPL-ACNC: 0.41 MIU/L (ref 0.44–3.98)

## 2025-01-13 PROCEDURE — 84443 ASSAY THYROID STIM HORMONE: CPT

## 2025-01-13 PROCEDURE — 84432 ASSAY OF THYROGLOBULIN: CPT

## 2025-01-13 PROCEDURE — 86800 THYROGLOBULIN ANTIBODY: CPT

## 2025-01-15 LAB
THYROGLOB AB SERPL-ACNC: <1 IU/ML (ref 0–0.9)
THYROGLOB SERPL-MCNC: <0.1 NG/ML (ref 1.5–38.5)

## 2025-01-16 ENCOUNTER — APPOINTMENT (OUTPATIENT)
Dept: ENDOCRINOLOGY | Facility: CLINIC | Age: 50
End: 2025-01-16
Payer: COMMERCIAL

## 2025-01-16 VITALS — BODY MASS INDEX: 26.09 KG/M2 | WEIGHT: 142.64 LBS

## 2025-01-16 DIAGNOSIS — C73 HURTHLE CELL CARCINOMA OF THYROID (MULTI): ICD-10-CM

## 2025-01-16 PROCEDURE — 1036F TOBACCO NON-USER: CPT | Performed by: INTERNAL MEDICINE

## 2025-01-16 PROCEDURE — 99214 OFFICE O/P EST MOD 30 MIN: CPT | Performed by: INTERNAL MEDICINE

## 2025-01-16 RX ORDER — LEVOTHYROXINE SODIUM 112 UG/1
112 TABLET ORAL EVERY OTHER DAY
Qty: 45 TABLET | Refills: 3 | Status: SHIPPED | OUTPATIENT
Start: 2025-01-16 | End: 2026-01-16

## 2025-01-16 RX ORDER — LEVOTHYROXINE SODIUM 100 UG/1
100 TABLET ORAL EVERY OTHER DAY
Qty: 45 TABLET | Refills: 3 | Status: SHIPPED | OUTPATIENT
Start: 2025-01-16 | End: 2026-01-16

## 2025-01-16 ASSESSMENT — ENCOUNTER SYMPTOMS
TROUBLE SWALLOWING: 0
PALPITATIONS: 0
NAUSEA: 0
ABDOMINAL PAIN: 0
FATIGUE: 0
WEAKNESS: 0
VOMITING: 0
DIARRHEA: 0
FEVER: 0
HEADACHES: 0
CONSTIPATION: 0
NERVOUS/ANXIOUS: 0
TREMORS: 0
UNEXPECTED WEIGHT CHANGE: 0
SHORTNESS OF BREATH: 0
NECK PAIN: 0

## 2025-01-16 NOTE — PATIENT INSTRUCTIONS
RECOMMENDATIONS  Continue levothyroxine 100 vs 112 mcg every other day  Take levothyroxine on an empty stomach with water alone, 1 hour before eating or taking other medications, 4 hours before any calcium or iron supplement.    Follow up 1 year  Draw TSH, thyroglobulin before next appointment

## 2025-01-16 NOTE — LETTER
January 16, 2025     Arnel Rodriguez MD  [address]  [facility], [address]  [place] [address]    Patient: Rula Leyva   YOB: 1975   Date of Visit: 1/16/2025       Dear Dr. Arnel Rodriguez MD:    Thank you for referring Rula Leyva to me for evaluation. Below are my notes for this consultation.  If you have questions, please do not hesitate to call me. I look forward to following your patient along with you.       Sincerely,     Chauncey Lawson MD      CC: No Recipients  ______________________________________________________________________________________    History Of Present Illness  Rula Leyva is a 49 y.o. female with a history of thyroid cancer.     Levothyroxine 100 vs 112 mcg/day  Patient is taking levothyroxine on an empty stomach with water alone.     Daughter (25) had thyroid surgery last month for papillary thyroid carcinoma.   Thyroid cancer diagnosis date: 10/8/15  Type: Hurthle cell  Size of primary tumor: 1.7 cm, capsular and vascular invasion, no ETE  Lymph Nodes: 0/1  Distant Metastases: None known  Pathologic Staging: pT1 N0 Mx. Clinical Stage: I     Surgery: Right lobectomy (10/8/15), Completion thyroidectomy (10/22/15)      Radioiodine Therapy (11/6/15): 156 mCi, hypothyroid  Post-Therapy 131-I WBS: Focal uptake in lower neck.     Past Medical History  She has a past medical history of Acute sinusitis, unspecified (11/13/2018), Anxiety disorder, unspecified, Essential (primary) hypertension (09/02/2015), Pain in throat (02/09/2018), Personal history of malignant neoplasm, unspecified, Personal history of other diseases of the female genital tract (02/27/2017), Personal history of other endocrine, nutritional and metabolic disease (09/15/2015), Personal history of other endocrine, nutritional and metabolic disease (08/18/2015), Personal history of other specified conditions (07/29/2015), Personal history of other specified conditions (12/17/2018),  Sialoadenitis, unspecified, Unspecified abnormal findings in urine (2015), and Unspecified voice and resonance disorder (10/20/2015).    Surgical History  She has a past surgical history that includes  section, classic (2014); Ankle surgery (2014); Dilation and curettage of uterus (2014); Tubal ligation (2014); Total thyroidectomy (2016); US guided thyroid biopsy (2015); and CT guided percutaneous biopsy lung (2015).     Social History  She reports that she has never smoked. She has never used smokeless tobacco. She reports current alcohol use. She reports that she does not use drugs.    Family History  Family History   Problem Relation Name Age of Onset   • Breast cancer Other          Unknown Family Member   • Pancreatic cancer Other          Unknown Family Member       Medications  Current Outpatient Medications   Medication Instructions   • amLODIPine (NORVASC) 5 mg, oral, 2 times daily   • levothyroxine (SYNTHROID, LEVOXYL) 100 mcg, oral, Every other day   • levothyroxine (SYNTHROID, LEVOXYL) 112 mcg, oral, Every other day       Allergies  Amoxicillin-pot clavulanate and Azithromycin    Review of Systems   Constitutional:  Negative for fatigue, fever and unexpected weight change.   HENT:  Negative for trouble swallowing.    Eyes:  Negative for visual disturbance.   Respiratory:  Negative for shortness of breath.    Cardiovascular:  Negative for chest pain and palpitations.   Gastrointestinal:  Negative for abdominal pain, constipation, diarrhea, nausea and vomiting.   Endocrine: Negative for cold intolerance and heat intolerance.   Musculoskeletal:  Negative for neck pain.   Skin:  Negative for rash.   Neurological:  Negative for tremors, weakness and headaches.   Psychiatric/Behavioral:  The patient is not nervous/anxious.          Last Recorded Vitals  Weight 64.7 kg (142 lb 10.2 oz).    Physical Exam  Constitutional:       General: She is not in acute  distress.  HENT:      Head: Normocephalic.      Mouth/Throat:      Mouth: Mucous membranes are moist.   Eyes:      Extraocular Movements: Extraocular movements intact.   Neck:      Comments: Thyroidectomy scar, no palpable gland.   Cardiovascular:      Pulses:           Radial pulses are 2+ on the right side and 2+ on the left side.   Musculoskeletal:      Right lower leg: No edema.      Left lower leg: No edema.   Lymphadenopathy:      Cervical: No cervical adenopathy.   Neurological:      Mental Status: She is alert.      Motor: No tremor.   Psychiatric:         Mood and Affect: Affect normal.          Relevant Results  Lab Results   Component Value Date    TSH 0.41 (L) 01/13/2025    THYROGLOBU <0.1 (L) 01/13/2025    THYROGLCMSMS Not Applicable 01/15/2024    THYROGLOBULI <1.0 01/13/2025           IMPRESSION  HURTHLE CELL CARCINOMA OF THYROID  Single lesion right lobe with capsular and vascular invasion, no extrathyroidal extension, no known lymph node involvement or metastasis.  No Thyroglobulin evidence of persistent disease at 9 years.   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       RECOMMENDATIONS  Continue levothyroxine 100 vs 112 mcg every other day  Take levothyroxine on an empty stomach with water alone, 1 hour before eating or taking other medications, 4 hours before any calcium or iron supplement.    Follow up 1 year  Draw TSH, thyroglobulin before next appointment

## 2025-01-16 NOTE — PROGRESS NOTES
History Of Present Illness  Rula Leyva is a 49 y.o. female with a history of thyroid cancer.     Levothyroxine 100 vs 112 mcg/day  Patient is taking levothyroxine on an empty stomach with water alone.     Daughter (25) had thyroid surgery last month for papillary thyroid carcinoma.   Thyroid cancer diagnosis date: 10/8/15  Type: Hurthle cell  Size of primary tumor: 1.7 cm, capsular and vascular invasion, no ETE  Lymph Nodes: 0/1  Distant Metastases: None known  Pathologic Staging: pT1 N0 Mx. Clinical Stage: I     Surgery: Right lobectomy (10/8/15), Completion thyroidectomy (10/22/15)      Radioiodine Therapy (11/6/15): 156 mCi, hypothyroid  Post-Therapy 131-I WBS: Focal uptake in lower neck.     Past Medical History  She has a past medical history of Acute sinusitis, unspecified (2018), Anxiety disorder, unspecified, Essential (primary) hypertension (2015), Pain in throat (2018), Personal history of malignant neoplasm, unspecified, Personal history of other diseases of the female genital tract (2017), Personal history of other endocrine, nutritional and metabolic disease (09/15/2015), Personal history of other endocrine, nutritional and metabolic disease (2015), Personal history of other specified conditions (2015), Personal history of other specified conditions (2018), Sialoadenitis, unspecified, Unspecified abnormal findings in urine (2015), and Unspecified voice and resonance disorder (10/20/2015).    Surgical History  She has a past surgical history that includes  section, classic (2014); Ankle surgery (2014); Dilation and curettage of uterus (2014); Tubal ligation (2014); Total thyroidectomy (2016); US guided thyroid biopsy (2015); and CT guided percutaneous biopsy lung (2015).     Social History  She reports that she has never smoked. She has never used smokeless tobacco. She reports current alcohol use. She  reports that she does not use drugs.    Family History  Family History   Problem Relation Name Age of Onset    Breast cancer Other          Unknown Family Member    Pancreatic cancer Other          Unknown Family Member       Medications  Current Outpatient Medications   Medication Instructions    amLODIPine (NORVASC) 5 mg, oral, 2 times daily    levothyroxine (SYNTHROID, LEVOXYL) 100 mcg, oral, Every other day    levothyroxine (SYNTHROID, LEVOXYL) 112 mcg, oral, Every other day       Allergies  Amoxicillin-pot clavulanate and Azithromycin    Review of Systems   Constitutional:  Negative for fatigue, fever and unexpected weight change.   HENT:  Negative for trouble swallowing.    Eyes:  Negative for visual disturbance.   Respiratory:  Negative for shortness of breath.    Cardiovascular:  Negative for chest pain and palpitations.   Gastrointestinal:  Negative for abdominal pain, constipation, diarrhea, nausea and vomiting.   Endocrine: Negative for cold intolerance and heat intolerance.   Musculoskeletal:  Negative for neck pain.   Skin:  Negative for rash.   Neurological:  Negative for tremors, weakness and headaches.   Psychiatric/Behavioral:  The patient is not nervous/anxious.          Last Recorded Vitals  Weight 64.7 kg (142 lb 10.2 oz).    Physical Exam  Constitutional:       General: She is not in acute distress.  HENT:      Head: Normocephalic.      Mouth/Throat:      Mouth: Mucous membranes are moist.   Eyes:      Extraocular Movements: Extraocular movements intact.   Neck:      Comments: Thyroidectomy scar, no palpable gland.   Cardiovascular:      Pulses:           Radial pulses are 2+ on the right side and 2+ on the left side.   Musculoskeletal:      Right lower leg: No edema.      Left lower leg: No edema.   Lymphadenopathy:      Cervical: No cervical adenopathy.   Neurological:      Mental Status: She is alert.      Motor: No tremor.   Psychiatric:         Mood and Affect: Affect normal.           Relevant Results  Lab Results   Component Value Date    TSH 0.41 (L) 01/13/2025    THYROGLOBU <0.1 (L) 01/13/2025    THYROGLCMSMS Not Applicable 01/15/2024    THYROGLOBULI <1.0 01/13/2025           IMPRESSION  HURTHLE CELL CARCINOMA OF THYROID  Single lesion right lobe with capsular and vascular invasion, no extrathyroidal extension, no known lymph node involvement or metastasis.  No Thyroglobulin evidence of persistent disease at 7 years.   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       RECOMMENDATIONS  Continue levothyroxine 100 vs 112 mcg every other day  Take levothyroxine on an empty stomach with water alone, 1 hour before eating or taking other medications, 4 hours before any calcium or iron supplement.    Follow up 1 year  Draw TSH, thyroglobulin before next appointment

## 2025-04-24 ENCOUNTER — APPOINTMENT (OUTPATIENT)
Dept: PRIMARY CARE | Facility: CLINIC | Age: 50
End: 2025-04-24
Payer: COMMERCIAL

## 2025-05-28 LAB
ALBUMIN SERPL-MCNC: 4.4 G/DL (ref 3.6–5.1)
ALP SERPL-CCNC: 54 U/L (ref 37–153)
ALT SERPL-CCNC: 13 U/L (ref 6–29)
ANION GAP SERPL CALCULATED.4IONS-SCNC: 9 MMOL/L (CALC) (ref 7–17)
AST SERPL-CCNC: 16 U/L (ref 10–35)
BILIRUB SERPL-MCNC: 0.7 MG/DL (ref 0.2–1.2)
BUN SERPL-MCNC: 14 MG/DL (ref 7–25)
CALCIUM SERPL-MCNC: 9.3 MG/DL (ref 8.6–10.4)
CHLORIDE SERPL-SCNC: 103 MMOL/L (ref 98–110)
CHOLEST SERPL-MCNC: 211 MG/DL
CHOLEST/HDLC SERPL: 2.7 (CALC)
CO2 SERPL-SCNC: 26 MMOL/L (ref 20–32)
CREAT SERPL-MCNC: 0.86 MG/DL (ref 0.5–1.03)
EGFRCR SERPLBLD CKD-EPI 2021: 82 ML/MIN/1.73M2
EST. AVERAGE GLUCOSE BLD GHB EST-MCNC: 105 MG/DL
EST. AVERAGE GLUCOSE BLD GHB EST-SCNC: 5.8 MMOL/L
GLUCOSE SERPL-MCNC: 95 MG/DL (ref 65–99)
HBA1C MFR BLD: 5.3 %
HDLC SERPL-MCNC: 77 MG/DL
LDLC SERPL CALC-MCNC: 119 MG/DL (CALC)
NONHDLC SERPL-MCNC: 134 MG/DL (CALC)
POTASSIUM SERPL-SCNC: 4.4 MMOL/L (ref 3.5–5.3)
PROT SERPL-MCNC: 7.2 G/DL (ref 6.1–8.1)
SODIUM SERPL-SCNC: 138 MMOL/L (ref 135–146)
TRIGL SERPL-MCNC: 49 MG/DL

## 2025-05-29 ENCOUNTER — APPOINTMENT (OUTPATIENT)
Dept: PRIMARY CARE | Facility: CLINIC | Age: 50
End: 2025-05-29
Payer: COMMERCIAL

## 2025-05-29 VITALS
BODY MASS INDEX: 26.76 KG/M2 | SYSTOLIC BLOOD PRESSURE: 150 MMHG | HEIGHT: 62 IN | WEIGHT: 145.4 LBS | OXYGEN SATURATION: 99 % | HEART RATE: 75 BPM | DIASTOLIC BLOOD PRESSURE: 92 MMHG

## 2025-05-29 DIAGNOSIS — E78.6 HDL DEFICIENCY: ICD-10-CM

## 2025-05-29 DIAGNOSIS — I10 HTN (HYPERTENSION), BENIGN: Primary | ICD-10-CM

## 2025-05-29 DIAGNOSIS — R73.02 IGT (IMPAIRED GLUCOSE TOLERANCE): ICD-10-CM

## 2025-05-29 DIAGNOSIS — C73 HURTHLE CELL CARCINOMA OF THYROID: ICD-10-CM

## 2025-05-29 DIAGNOSIS — Z12.11 COLON CANCER SCREENING: ICD-10-CM

## 2025-05-29 PROCEDURE — 3008F BODY MASS INDEX DOCD: CPT | Performed by: INTERNAL MEDICINE

## 2025-05-29 PROCEDURE — 99213 OFFICE O/P EST LOW 20 MIN: CPT | Performed by: INTERNAL MEDICINE

## 2025-05-29 PROCEDURE — 3077F SYST BP >= 140 MM HG: CPT | Performed by: INTERNAL MEDICINE

## 2025-05-29 PROCEDURE — 3080F DIAST BP >= 90 MM HG: CPT | Performed by: INTERNAL MEDICINE

## 2025-05-29 NOTE — PROGRESS NOTES
"Subjective   Patient ID: Rula Leyva is a 49 y.o. female who presents for Follow-up (6m follow up).    HPI some family stress. Handling ok. Otherwise well  Fu htn, hurthle cell ca thyroid, igt, low hdl    Review of Systems  As above  Bps ok at home    Objective   BP (!) 150/92   Pulse 75   Ht 1.575 m (5' 2\")   Wt 66 kg (145 lb 6.4 oz)   SpO2 99%   BMI 26.59 kg/m²     Physical Exam  Gen nad, affect sad  Heentt eomfg, face symmetric, ncat  Neck w/o la, tm, bruit  Lungs clear   Cv rrr nl s1, s2  Ext w/o edema  Neuro grossly nonfocal  Skin good color  Labs reviewed    Assessment/Plan   Diagnoses and all orders for this visit:  HTN (hypertension), benign  IGT (impaired glucose tolerance)  HDL deficiency  Hurthle cell carcinoma of thyroid  Colon cancer screening  -     Referral to Gastroenterology; Future     Fu 6 mos  Call if problems  "

## 2025-07-18 ENCOUNTER — APPOINTMENT (OUTPATIENT)
Dept: OTOLARYNGOLOGY | Facility: CLINIC | Age: 50
End: 2025-07-18
Payer: COMMERCIAL

## 2025-09-05 ENCOUNTER — APPOINTMENT (OUTPATIENT)
Dept: OTOLARYNGOLOGY | Facility: CLINIC | Age: 50
End: 2025-09-05
Payer: COMMERCIAL

## 2025-09-12 ENCOUNTER — APPOINTMENT (OUTPATIENT)
Dept: OTOLARYNGOLOGY | Facility: CLINIC | Age: 50
End: 2025-09-12
Payer: COMMERCIAL

## 2025-12-01 ENCOUNTER — APPOINTMENT (OUTPATIENT)
Dept: PRIMARY CARE | Facility: CLINIC | Age: 50
End: 2025-12-01
Payer: COMMERCIAL

## 2026-01-15 ENCOUNTER — APPOINTMENT (OUTPATIENT)
Dept: ENDOCRINOLOGY | Facility: CLINIC | Age: 51
End: 2026-01-15
Payer: COMMERCIAL